# Patient Record
Sex: MALE | URBAN - METROPOLITAN AREA
[De-identification: names, ages, dates, MRNs, and addresses within clinical notes are randomized per-mention and may not be internally consistent; named-entity substitution may affect disease eponyms.]

---

## 2017-05-10 ENCOUNTER — EMERGENCY (EMERGENCY)
Facility: HOSPITAL | Age: 47
LOS: 1 days | Discharge: HOME | End: 2017-05-10
Attending: EMERGENCY MEDICINE | Admitting: EMERGENCY MEDICINE
Payer: SELF-PAY

## 2017-05-10 VITALS
HEART RATE: 70 BPM | DIASTOLIC BLOOD PRESSURE: 100 MMHG | OXYGEN SATURATION: 95 % | SYSTOLIC BLOOD PRESSURE: 136 MMHG | RESPIRATION RATE: 16 BRPM

## 2017-05-10 DIAGNOSIS — L84 CORNS AND CALLOSITIES: ICD-10-CM

## 2017-05-10 DIAGNOSIS — I10 ESSENTIAL (PRIMARY) HYPERTENSION: ICD-10-CM

## 2017-05-10 DIAGNOSIS — R46.0 VERY LOW LEVEL OF PERSONAL HYGIENE: ICD-10-CM

## 2017-05-10 DIAGNOSIS — M79.672 PAIN IN LEFT FOOT: ICD-10-CM

## 2017-05-10 DIAGNOSIS — F17.200 NICOTINE DEPENDENCE, UNSPECIFIED, UNCOMPLICATED: ICD-10-CM

## 2017-05-10 PROCEDURE — 99283 EMERGENCY DEPT VISIT LOW MDM: CPT | Mod: 25

## 2017-05-10 PROCEDURE — 99053 MED SERV 10PM-8AM 24 HR FAC: CPT

## 2017-05-10 RX ORDER — IBUPROFEN 200 MG
600 TABLET ORAL ONCE
Qty: 0 | Refills: 0 | Status: DISCONTINUED | OUTPATIENT
Start: 2017-05-10 | End: 2017-05-10

## 2017-05-10 RX ORDER — ACETAMINOPHEN 500 MG
650 TABLET ORAL ONCE
Qty: 0 | Refills: 0 | Status: COMPLETED | OUTPATIENT
Start: 2017-05-10 | End: 2017-05-10

## 2017-05-10 RX ORDER — IBUPROFEN 200 MG
600 TABLET ORAL ONCE
Qty: 0 | Refills: 0 | Status: COMPLETED | OUTPATIENT
Start: 2017-05-10 | End: 2017-05-10

## 2017-05-10 RX ADMIN — Medication 600 MILLIGRAM(S): at 00:54

## 2017-05-10 RX ADMIN — Medication 650 MILLIGRAM(S): at 00:33

## 2017-05-10 RX ADMIN — Medication 650 MILLIGRAM(S): at 00:54

## 2017-05-10 NOTE — ED PROVIDER NOTE - SKIN, MLM
Skin normal color for race, warm, dry and intact. No evidence of rash. +callus to b/l great toe and slight maceration of the skin to the interdigital spaces, no d/c, bleeding, crepitus, erythema, edema, or rash

## 2017-05-10 NOTE — ED PROVIDER NOTE - CONSTITUTIONAL, MLM
normal... Well appearing, well nourished M, awake, alert, oriented to person, place, time/situation and in no apparent distress.

## 2017-05-10 NOTE — ED PROVIDER NOTE - CARE PLAN
Principal Discharge DX:	Callus of foot  Secondary Diagnosis:	Poor hygiene  Secondary Diagnosis:	Chronic foot pain

## 2017-05-10 NOTE — ED PROVIDER NOTE - OBJECTIVE STATEMENT
47 yo M with PMHx of HTN, BIBA under PD custody presenting c/o b/l feet pain. 45 yo M with PMHx of HTN, BIBA under PD custody presenting c/o b/l feet pain. Pt reports not having good hygiene x 1 wk, has callus to b/l feet region and has been walking and wearing the same boots.  Now with worsening pain to b/l feet.  Also admits to smoking some K2 today.  Denies trauma, break in the skin, paresthesia, numbness, tingling, redness, bleeding, d/c, HA, dizziness, SOB, CP, calf pain, and focal weakness.  Pt is ambulatory

## 2017-05-10 NOTE — ED PROVIDER NOTE - MEDICAL DECISION MAKING DETAILS
given dose of pain meds, no s/s of infection on exam or acute intervention indicated, monitored in the ED, AxOx3, medically stable for d/c

## 2018-10-17 ENCOUNTER — EMERGENCY (EMERGENCY)
Facility: HOSPITAL | Age: 48
LOS: 1 days | Discharge: HOME | End: 2018-10-17
Attending: EMERGENCY MEDICINE | Admitting: EMERGENCY MEDICINE
Payer: SELF-PAY

## 2018-10-17 VITALS
DIASTOLIC BLOOD PRESSURE: 79 MMHG | SYSTOLIC BLOOD PRESSURE: 116 MMHG | OXYGEN SATURATION: 98 % | TEMPERATURE: 98 F | RESPIRATION RATE: 16 BRPM | HEART RATE: 74 BPM

## 2018-10-17 VITALS
WEIGHT: 154.98 LBS | SYSTOLIC BLOOD PRESSURE: 118 MMHG | OXYGEN SATURATION: 97 % | DIASTOLIC BLOOD PRESSURE: 86 MMHG | TEMPERATURE: 98 F | HEART RATE: 75 BPM | RESPIRATION RATE: 17 BRPM

## 2018-10-17 DIAGNOSIS — R25.1 TREMOR, UNSPECIFIED: ICD-10-CM

## 2018-10-17 DIAGNOSIS — F10.10 ALCOHOL ABUSE, UNCOMPLICATED: ICD-10-CM

## 2018-10-17 PROCEDURE — 99283 EMERGENCY DEPT VISIT LOW MDM: CPT

## 2018-10-17 RX ADMIN — Medication 50 MILLIGRAM(S): at 17:29

## 2018-10-17 NOTE — ED PROVIDER NOTE - NSFOLLOWUPCLINICS_GEN_ALL_ED_FT
[facility]  Family Medicine  [facility]. [address], [address]  [place], [place] [facility]  Phone: [phone]  Fax:   Follow Up Time:

## 2018-10-17 NOTE — ED PROVIDER NOTE - PHYSICAL EXAMINATION
VITAL SIGNS: I have reviewed nursing notes and confirm.  CONSTITUTIONAL: Well-developed; well-nourished; in no acute distress.  SKIN: Skin is warm and dry, no acute rash.  HEAD: Normocephalic; atraumatic.  EYES: PERRL, EOM intact; conjunctiva and sclera clear.  ENT: No nasal discharge; airway clear.  Minimal tongue faciculations.   NECK: Supple; non tender.  CARD: S1, S2 normal; no murmurs, gallops, or rubs. Regular rate and rhythm.  RESP: No wheezes, rales or rhonchi.  ABD: Normal bowel sounds; soft; non-distended; non-tender; no hepatosplenomegaly.  EXT: Normal ROM. No clubbing, cyanosis or edema.  NEURO: Alert, oriented. Grossly unremarkable.  Minimal tremors.   PSYCH: Cooperative, appropriate.

## 2018-10-17 NOTE — ED PROVIDER NOTE - OBJECTIVE STATEMENT
Pt is a 48yo M with a h/o AA.  Reports szrs in past 2/2 "alcohol use" and denies epilepsy.  Not on any medications.  Reports szr this morning.  Unwitnessed.  Pt unable to give any details about szr.  Reports last alcoholic drink was last night.  Reports mild tremors.  Denies any HA, N/V, abd pain, AH/VH/TH, anxiety, or disorientation.  No other complaints.

## 2018-10-17 NOTE — ED PROVIDER NOTE - NSFOLLOWUPINSTRUCTIONS_ED_ALL_ED_FT
PLEASE FOLLOW-UP WITH YOUR PRIMARY CARE DOCTOR IN 1-2 DAY FOR FURTHER EVALUATION.  PLEASE TAKE ALL PAPERWORK FROM TODAY'S VISIT TO YOUR PRIMARY DOCTOR.  IF YOU DO NOT HAVE A PRIMARY CARE DOCTOR PLEASE REFER TO THE OFFICE/CLINIC INFORMATION GIVEN ABOVE.  YOU MAY ALSO CALL [phone] AND ASK FOR MS. JAM NASH.  SHE CAN HELP YOU MAKE A FOLLOW-UP APPOINTMENT.  HER HOURS ARE 11AM-7PM MONDAY - FRIDAY.    PLEASE RETURN TO THE ER IMMEDIATELY OR CALL 911 FOR ANY HIGH FEVER, TROUBLE BREATHING, VOMITING, SEVERE PAIN, OR ANY OTHER CONCERNS.

## 2018-10-17 NOTE — ED ADULT TRIAGE NOTE - CHIEF COMPLAINT QUOTE
here for unwitnessed seizure- states last seizure was 1 year ago- admits to drinking alcohol everyday- last drink was last night- also admits to marijuana- Pt is neg for biting of the tongue, urinary incontinence. Pt is a+ox3 on arrival and is non tremulous

## 2018-10-17 NOTE — ED ADULT NURSE NOTE - NSIMPLEMENTINTERV_GEN_ALL_ED
Implemented All Universal Safety Interventions:  [place] to call system. Call bell, personal items and telephone within reach. Instruct patient to call for assistance. Room bathroom lighting operational. Non-slip footwear when patient is off stretcher. Physically safe environment: no spills, clutter or unnecessary equipment. Stretcher in lowest position, wheels locked, appropriate side rails in place.

## 2019-12-05 ENCOUNTER — HOSPITAL ENCOUNTER (INPATIENT)
Dept: HOSPITAL 74 - YASAS | Age: 49
LOS: 8 days | Discharge: HOME | DRG: 772 | End: 2019-12-13
Attending: NEUROMUSCULOSKELETAL MEDICINE & OMM | Admitting: NEUROMUSCULOSKELETAL MEDICINE & OMM
Payer: COMMERCIAL

## 2019-12-05 VITALS — BODY MASS INDEX: 23.8 KG/M2

## 2019-12-05 DIAGNOSIS — Z59.0: ICD-10-CM

## 2019-12-05 DIAGNOSIS — F12.20: ICD-10-CM

## 2019-12-05 DIAGNOSIS — F17.210: ICD-10-CM

## 2019-12-05 DIAGNOSIS — Z87.438: ICD-10-CM

## 2019-12-05 DIAGNOSIS — F14.20: ICD-10-CM

## 2019-12-05 DIAGNOSIS — I10: ICD-10-CM

## 2019-12-05 DIAGNOSIS — F10.20: Primary | ICD-10-CM

## 2019-12-05 PROCEDURE — G0008 ADMIN INFLUENZA VIRUS VAC: HCPCS

## 2019-12-05 PROCEDURE — HZ42ZZZ GROUP COUNSELING FOR SUBSTANCE ABUSE TREATMENT, COGNITIVE-BEHAVIORAL: ICD-10-PCS | Performed by: ALLERGY & IMMUNOLOGY

## 2019-12-05 RX ADMIN — Medication SCH MG: at 22:40

## 2019-12-05 SDOH — ECONOMIC STABILITY - HOUSING INSECURITY: HOMELESSNESS: Z59.0

## 2019-12-05 NOTE — PN
Teaching Attending Note


Name of Resident: Supriya Mahan





ATTENDING PHYSICIAN STATEMENT





I saw and evaluated the patient.


I reviewed the resident's note and discussed the case with the resident.


I agree with the resident's findings and plan as documented.








SUBJECTIVE: pt here requesting detox from etoh use , reports 1-3  drinks daily  

20 oz cans and some liquor on weekends,  denies symptoms if  not drinking  . 








OBJECTIVE:  wnwd  , NAD  





ASSESSMENT AND PLAN: Alcohol  use  d/o  - rehab 


 Vital Signs - 24 hr











  12/05/19 12/05/19





  16:29 18:40


 


Temperature 98.1 F 98.1 F


 


Pulse Rate 99 H 99 H


 


Respiratory 17 17





Rate  


 


Blood Pressure 155/95 155/95

## 2019-12-05 NOTE — HP
CIWA Score


Nausea/Vomitin-No Nausea/No Vomiting


Muscle Tremors: None


Anxiety: 0-No Anxiety, at Ease


Agitation: 0-Normal Activity


Paroxysmal Sweats: No Perspiration


Orientation: 0-Oriented


Tacttile Disturbances: 0-None


Auditory Disturbances: 0-None


Visual Disturbances: 0-None


Headache: 0-None Present


CIWA-Ar Total Score: 0





- Admission Criteria


[facility] Guidelines: Admission for Medically Managed Detox: 


Requires at least one of the followin. CIWA greater than 12


2. Seizures within the past 24 hours


3. Delirium tremens within the past 24 hours


4. Hallucinations within the past 24 hours


5. Acute intervention needed for co  occurring medical disorder


6. Acute intervention needed for co  occurring psychiatric disorder


7. Severe withdrawal that cannot be handled at a lower level of care (continued


    vomiting, continued diarrhea, abnormal vital signs) requiring intravenous


    medication and/or fluids


8. Pregnancy








Admission ROS [facility]





- [facility]


Allergies/Adverse Reactions: 


 Allergies











Allergy/AdvReac Type Severity Reaction Status Date / Time


 


No Known Allergies Allergy   Verified 19 16:29











History of Present Illness: 





49 y/o M with history of alcohol, crack cocaine and marijuana use presenting to 

[facility] initially for detox as he is looking to go into a "long term program 

which can eventually help with housing." Pt has been drinking since he was in 

his teens. He currently drinks about 2-3 12oz can of marguerita alcohol a day 

and half a pint of vodka on weekends. he denies currently experiencing any 

withdrawal symptoms.He denies any blackouts or withdrawal seizure. LAst drink 

this aftern


He also smokes crack cocaine on and off since he was 18. he currently smokes 40-

50$ twice a month. no injection


He also smokes 2-3 blunts twice a week. 1PPD of cigarettes since 16 years of 

age.





PMH: HTN


PSYCH HX: none


PSH:  Maxillofacial surgery from dog bite





social Hx: currently lives with family, unemployed





PE: 


VS : 98.1F, 155/95 mmHg, 99bpm, 17


Utox: THC 


CORAL: 0.044


CIWA:0





General : NAD


HEENT: PERRLA, moist mucous membranes


LUNGS: VBS b/l


HEART: RRR no MRG


Abdomen: +BS, NTND


extremities: 2+ pulses, no edema


neuro: grossly intact





Plan: substance use disorder: admit to rehab when CORAL is 0


       HTN: norvasc 5mg PO daily 


      Smoking cessation counseled: declined patch 





 





- Ebola screening


Have you traveled outside of the country in the last 21 days: No


Have you had contact with anyone from an Ebola affected area: No


Do you have a fever: No





Patient History





- Smoking Cessation


Smoking history: Current every day smoker


Aproximately how many cigarettes per day: 20


Hx Chewing Tobacco Use: No


Initiated information on smoking cessation: Yes


'Breaking Loose' booklet given: 19





- Substances abused


  ** Alcohol


Substance route: Oral


Frequency: Daily


Amount used: 1/2 PINT OF VODKA, 3-12 OZ MARGARITAS


Age of first use: 11


Date of last use: 19





  ** Crack


Substance route: Smoking


Frequency: 1-3 times last 30 days


Amount used: $50


Age of first use: 17


Date of last use: 19





  ** Marijuana/Hashish


Substance route: Smoking


Frequency: 1-2 times per week


Amount used: $20


Age of first use: 11


Date of last use: 19





Admission Physical Exam [facility]





- Vital Signs


Vital Signs: 


 Vital Signs - 24 hr











  19





  16:29 18:40


 


Temperature 98.1 F 98.1 F


 


Pulse Rate 99 H 99 H


 


Respiratory 17 17





Rate  


 


Blood Pressure 155/95 155/95














Breathalyzer





- Breathalyzer


Breathalyzer: 0.044





Urine Drug Screen





- Test Device


Lot number: [id number]


Expiration date: 21





- Control


Is test valid?: Yes





- Results


Drug screen NEGATIVE: No


Urine drug screen results: THC-Marijuana





Inpatient Rehab Admission





- Rehab Decision to Admit


Inpatient rehab admission?: Yes





- Initial Determination


Are CD services needed?: Yes


Free of communicable disease: Yes


Not in need of hospitalization: Yes





- Rehab Admission Criteria


Previous failed treatment: No


Poor recovery environment: Yes


Comorbidities: Yes


Lacks judgement: Yes


Patient is meeting Inpatient Rehab admission criteria:: Yes

## 2019-12-06 LAB
ALBUMIN SERPL-MCNC: 3.8 G/DL (ref 3.4–5)
ALP SERPL-CCNC: 104 U/L (ref 45–117)
ALT SERPL-CCNC: 20 U/L (ref 13–61)
ANION GAP SERPL CALC-SCNC: 5 MMOL/L (ref 8–16)
AST SERPL-CCNC: 13 U/L (ref 15–37)
BILIRUB SERPL-MCNC: 0.5 MG/DL (ref 0.2–1)
BUN SERPL-MCNC: 10.5 MG/DL (ref 7–18)
CALCIUM SERPL-MCNC: 9.6 MG/DL (ref 8.5–10.1)
CHLORIDE SERPL-SCNC: 104 MMOL/L (ref 98–107)
CO2 SERPL-SCNC: 32 MMOL/L (ref 21–32)
CREAT SERPL-MCNC: 0.9 MG/DL (ref 0.55–1.3)
DEPRECATED RDW RBC AUTO: 15.2 % (ref 11.9–15.9)
GLUCOSE SERPL-MCNC: 89 MG/DL (ref 74–106)
HCT VFR BLD CALC: 39.6 % (ref 35.4–49)
HGB BLD-MCNC: 12.6 GM/DL (ref 11.7–16.9)
MCH RBC QN AUTO: 27.8 PG (ref 25.7–33.7)
MCHC RBC AUTO-ENTMCNC: 31.9 G/DL (ref 32–35.9)
MCV RBC: 87.2 FL (ref 80–96)
PLATELET # BLD AUTO: 272 K/MM3 (ref 134–434)
PMV BLD: 8.2 FL (ref 7.5–11.1)
POTASSIUM SERPLBLD-SCNC: 4.4 MMOL/L (ref 3.5–5.1)
PROT SERPL-MCNC: 7.1 G/DL (ref 6.4–8.2)
RBC # BLD AUTO: 4.54 M/MM3 (ref 4–5.6)
SODIUM SERPL-SCNC: 141 MMOL/L (ref 136–145)
WBC # BLD AUTO: 6 K/MM3 (ref 4–10)

## 2019-12-06 RX ADMIN — Medication SCH TAB: at 09:06

## 2019-12-06 RX ADMIN — Medication SCH MG: at 21:21

## 2019-12-06 NOTE — PN
[facility] Progress Note


Note: 





Pt is a 47 y/o male with a hx of [place] admitted to rehab from [facility]. Pt reorts he 

has no PCP and goes to [facility] for care when needed. Pt has a hx of 

unmanaged HTN and was currently put on Amlodipine 5 mg po daily on admission. 

Pt reports he would like to go to a long term treatment after here.





 Vital Signs - 24 hr











  12/05/19 12/05/19 12/05/19





  16:29 18:40 21:39


 


Temperature 98.1 F 98.1 F 97.9 F


 


Pulse Rate 99 H 99 H 74


 


Respiratory 17 17 18





Rate   


 


Blood Pressure 155/95 155/95 














  12/05/19 12/06/19 12/06/19





  22:36 00:30 03:30


 


Temperature 97.8 F  


 


Pulse Rate 74  


 


Respiratory 19 18 18





Rate   


 


Blood Pressure 148/89  














  12/06/19





  09:05


 


Temperature 


 


Pulse Rate 91 H


 


Respiratory 





Rate 


 


Blood Pressure 162/106 H








 Laboratory Tests











  12/06/19 12/06/19





  10:00 10:00


 


WBC  6.0 


 


RBC  4.54 


 


Hgb  12.6 


 


Hct  39.6 


 


MCV  87.2 


 


MCH  27.8 


 


MCHC  31.9 L 


 


RDW  15.2 


 


Plt Count  272 


 


MPV  8.2 


 


Sodium   141


 


Potassium   4.4


 


Chloride   104


 


Carbon Dioxide   32


 


Anion Gap   5 L


 


BUN   10.5


 


Creatinine   0.9


 


Est GFR (CKD-EPI)AfAm   115.02


 


Est GFR (CKD-EPI)NonAf   99.24


 


Random Glucose   89


 


Calcium   9.6


 


Total Bilirubin   0.5


 


AST   13 L


 


ALT   20


 


Alkaline Phosphatase   104


 


Total Protein   7.1


 


Albumin   3.8








Alert o x 3


nad


oob ambulating with steady gait








A/P


 New rehab pt


Uncontrolled HTN








Maintain safety


D/w pt will increase Amlodipine 10 mg po daily 


Clonidine 0.1 mg po BID prn for elevated BP


Pt will follow up with counselor to arrange for aftercare.


Pt will need to be connectd to a primary care after long term treatment.

## 2019-12-07 LAB
APPEARANCE UR: CLEAR
BACTERIA # UR AUTO: 23.9 /HPF
BILIRUB UR STRIP.AUTO-MCNC: NEGATIVE MG/DL
CASTS URNS QL MICRO: 16 /LPF (ref 0–8)
COLOR UR: YELLOW
EPITH CASTS URNS QL MICRO: 0.2 /HPF
KETONES UR QL STRIP: NEGATIVE
LEUKOCYTE ESTERASE UR QL STRIP.AUTO: (no result)
NITRITE UR QL STRIP: NEGATIVE
PH UR: 8.5 [PH] (ref 5–8)
PROT UR QL STRIP: NEGATIVE
PROT UR QL STRIP: NEGATIVE
RPR: (no result)
SP GR UR: 1.02 (ref 1.01–1.03)
TREPONEMA ANTIBODY: REACTIVE
UROBILINOGEN UR STRIP-MCNC: 0.2 MG/DL (ref 0.2–1)
WBC # UR AUTO: 28 /HPF (ref 0–5)

## 2019-12-07 RX ADMIN — Medication SCH MG: at 21:10

## 2019-12-07 RX ADMIN — AMLODIPINE BESYLATE SCH MG: 10 TABLET ORAL at 10:20

## 2019-12-07 RX ADMIN — Medication SCH TAB: at 10:20

## 2019-12-07 NOTE — PN
[facility] Progress Note


Note: 





 Laboratory Last Values











WBC  6.0 K/mm3 (4.0-10.0)   12/06/19  10:00    


 


RBC  4.54 M/mm3 (4.00-5.60)   12/06/19  10:00    


 


Hgb  12.6 GM/dL (11.7-16.9)   12/06/19  10:00    


 


Hct  39.6 % (35.4-49)   12/06/19  10:00    


 


MCV  87.2 fl (80-96)   12/06/19  10:00    


 


MCH  27.8 pg (25.7-33.7)   12/06/19  10:00    


 


MCHC  31.9 g/dl (32.0-35.9)  L  12/06/19  10:00    


 


RDW  15.2 % (11.9-15.9)   12/06/19  10:00    


 


Plt Count  272 K/MM3 (134-434)   12/06/19  10:00    


 


MPV  8.2 fl (7.5-11.1)   12/06/19  10:00    


 


Sodium  141 mmol/L (136-145)   12/06/19  10:00    


 


Potassium  4.4 mmol/L (3.5-5.1)   12/06/19  10:00    


 


Chloride  104 mmol/L ()   12/06/19  10:00    


 


Carbon Dioxide  32 mmol/L (21-32)   12/06/19  10:00    


 


Anion Gap  5 MMOL/L (8-16)  L  12/06/19  10:00    


 


BUN  10.5 mg/dL (7-18)   12/06/19  10:00    


 


Creatinine  0.9 mg/dL (0.55-1.3)   12/06/19  10:00    


 


Est GFR (CKD-EPI)AfAm  115.02   12/06/19  10:00    


 


Est GFR (CKD-EPI)NonAf  99.24   12/06/19  10:00    


 


Random Glucose  89 mg/dL ()   12/06/19  10:00    


 


Calcium  9.6 mg/dL (8.5-10.1)   12/06/19  10:00    


 


Total Bilirubin  0.5 mg/dL (0.2-1)   12/06/19  10:00    


 


AST  13 U/L (15-37)  L  12/06/19  10:00    


 


ALT  20 U/L (13-61)   12/06/19  10:00    


 


Alkaline Phosphatase  104 U/L ()   12/06/19  10:00    


 


Total Protein  7.1 g/dl (6.4-8.2)   12/06/19  10:00    


 


Albumin  3.8 g/dl (3.4-5.0)   12/06/19  10:00    


 


Urine Color  Yellow   12/07/19  08:40    


 


Urine Appearance  Clear   12/07/19  08:40    


 


Urine pH  8.5  (5.0-8.0)  H  12/07/19  08:40    


 


Ur Specific Gravity  1.023  (1.010-1.035)   12/07/19  08:40    


 


Urine Protein  Negative  (NEGATIVE)   12/07/19  08:40    


 


Urine Glucose (UA)  Negative  (NEGATIVE)   12/07/19  08:40    


 


Urine Ketones  Negative  (NEGATIVE)   12/07/19  08:40    


 


Urine Blood  1+  (NEGATIVE)  H  12/07/19  08:40    


 


Urine Nitrite  Negative  (NEGATIVE)   12/07/19  08:40    


 


Urine Bilirubin  Negative  (NEGATIVE)   12/07/19  08:40    


 


Urine Urobilinogen  0.2 mg/dL (0.2-1.0)   12/07/19  08:40    


 


Ur Leukocyte Esterase  1+  (NEGATIVE)  H  12/07/19  08:40    


 


Urine WBC (Auto)  28 /hpf (0-5)   12/07/19  08:40    


 


Urine Casts (Auto)  16 /lpf (0-8)   12/07/19  08:40    


 


U Epithel Cells (Auto)  0.2 /HPF (0-5/HPF)   12/07/19  08:40    


 


Urine Bacteria (Auto)  23.9 /hpf (NEGATIVE)   12/07/19  08:40    


 


RPR Titer  Reactive 1:8  (NONREACTIVE)  H  12/06/19  10:00    








rpr 1:8


treponemal antibody pending


waiting for treponemal antibody result

## 2019-12-08 RX ADMIN — Medication SCH TAB: at 10:13

## 2019-12-08 RX ADMIN — Medication SCH MG: at 21:10

## 2019-12-08 RX ADMIN — AMLODIPINE BESYLATE SCH MG: 10 TABLET ORAL at 10:14

## 2019-12-09 RX ADMIN — Medication SCH MG: at 21:16

## 2019-12-09 RX ADMIN — AMLODIPINE BESYLATE SCH MG: 10 TABLET ORAL at 10:43

## 2019-12-09 RX ADMIN — Medication SCH TAB: at 10:43
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
## 2019-12-10 RX ADMIN — AMLODIPINE BESYLATE SCH MG: 10 TABLET ORAL at 10:36

## 2019-12-10 RX ADMIN — Medication SCH: at 21:57

## 2019-12-10 RX ADMIN — TOLNAFTATE SCH: 10 CREAM TOPICAL at 21:57

## 2019-12-10 RX ADMIN — Medication SCH TAB: at 10:36

## 2019-12-11 VITALS — TEMPERATURE: 97.8 F

## 2019-12-11 RX ADMIN — TOLNAFTATE SCH APPLIC: 10 CREAM TOPICAL at 10:25

## 2019-12-11 RX ADMIN — Medication SCH MG: at 21:15

## 2019-12-11 RX ADMIN — AMLODIPINE BESYLATE SCH MG: 10 TABLET ORAL at 10:29

## 2019-12-11 RX ADMIN — TOLNAFTATE SCH APPLIC: 10 CREAM TOPICAL at 21:15

## 2019-12-11 RX ADMIN — Medication SCH TAB: at 10:29

## 2019-12-12 RX ADMIN — Medication SCH MG: at 21:24

## 2019-12-12 RX ADMIN — Medication SCH TAB: at 10:11

## 2019-12-12 RX ADMIN — TOLNAFTATE SCH: 10 CREAM TOPICAL at 10:12

## 2019-12-12 RX ADMIN — AMLODIPINE BESYLATE SCH MG: 10 TABLET ORAL at 10:11

## 2019-12-12 RX ADMIN — TOLNAFTATE SCH: 10 CREAM TOPICAL at 21:24

## 2019-12-13 VITALS — HEART RATE: 67 BPM | DIASTOLIC BLOOD PRESSURE: 76 MMHG | SYSTOLIC BLOOD PRESSURE: 130 MMHG

## 2019-12-13 RX ADMIN — Medication SCH TAB: at 09:02

## 2019-12-13 RX ADMIN — TOLNAFTATE SCH APPLIC: 10 CREAM TOPICAL at 09:02

## 2019-12-13 RX ADMIN — AMLODIPINE BESYLATE SCH MG: 10 TABLET ORAL at 09:02

## 2019-12-13 NOTE — DS
[facility] Rehab Discharge Summary





- [facility] Rehab Discharge Summary


Admission Date: 12/05/19


Discharge Date: 12/13/19





- History


Present History: Alcohol dependence, Cocaine dependence





- Discharge Physical Exam


Vital Signs: 


 Vital Signs











Temperature  97.8 F   12/13/19 06:49


 


Pulse Rate  67   12/13/19 06:49


 


Respiratory Rate  18   12/13/19 06:49


 


Blood Pressure  130/76   12/13/19 06:49


 


O2 Sat by Pulse Oximetry (%)      











Pertinent Admission Physical Exam Findings: 





49 y/o M with history of alcohol, crack cocaine and marijuana. Pt has been 

drinking since he was in his teens. He currently drinks about 2-3 12oz can of 

marguerita alcohol a day and half a pint of vodka on weekends. He denies 

currently experiencing any withdrawal symptom: any blackouts, withdrawal 

seizure and shakes. He also smokes crack cocaine on and off since he was 18. he 

currently smokes 40-50$ twice a month. no injection


He also smokes 2-3 blunts twice a week. 1PPD of cigarettes since 16 years of 

age.





PMH: HTN





ROS: denies alcohol cravings, n/v/d, shakes and sweating








PE





alert and oriented x 3


skin warm and dry


+perrla, eoms intact bl


gi nt, nd


ext full rom, amb ad juancarlos


no tremors


in NAD


denies SI/HI





- Treatment


Discharge Condition: Discharge condition good


Hospital Course: 





Patient completed rehab, attended group meetings and 1:1 counseling sessions. 

He was treated for Syphilis during admission. Patient in stable medical 

condition and denies SI/HI. Aftercare arranged for [facility] which he will 

go to today after discharge. Patient encouraged to continue group meetings to 

prevent relapse and follow up with PCP for medical management. 


 Laboratory Tests











  12/06/19 12/06/19 12/06/19





  10:00 10:00 10:00


 


WBC  6.0  


 


RBC  4.54  


 


Hgb  12.6  


 


Hct  39.6  


 


MCV  87.2  


 


MCH  27.8  


 


MCHC  31.9 L  


 


RDW  15.2  


 


Plt Count  272  


 


MPV  8.2  


 


Sodium   141 


 


Potassium   4.4 


 


Chloride   104 


 


Carbon Dioxide   32 


 


Anion Gap   5 L 


 


BUN   10.5 


 


Creatinine   0.9 


 


Est GFR (CKD-EPI)AfAm   115.02 


 


Est GFR (CKD-EPI)NonAf   99.24 


 


Random Glucose   89 


 


Calcium   9.6 


 


Total Bilirubin   0.5 


 


AST   13 L 


 


ALT   20 


 


Alkaline Phosphatase   104 


 


Total Protein   7.1 


 


Albumin   3.8 


 


Urine Color   


 


Urine Appearance   


 


Urine pH   


 


Ur Specific Gravity   


 


Urine Protein   


 


Urine Glucose (UA)   


 


Urine Ketones   


 


Urine Blood   


 


Urine Nitrite   


 


Urine Bilirubin   


 


Urine Urobilinogen   


 


Ur Leukocyte Esterase   


 


Urine WBC (Auto)   


 


Urine Casts (Auto)   


 


U Epithel Cells (Auto)   


 


Urine Bacteria (Auto)   


 


RPR Titer    Reactive 1:8 H


 


T.pallidum Ab (MHA)    Reactive














  12/07/19





  08:40


 


WBC 


 


RBC 


 


Hgb 


 


Hct 


 


MCV 


 


MCH 


 


MCHC 


 


RDW 


 


Plt Count 


 


MPV 


 


Sodium 


 


Potassium 


 


Chloride 


 


Carbon Dioxide 


 


Anion Gap 


 


BUN 


 


Creatinine 


 


Est GFR (CKD-EPI)AfAm 


 


Est GFR (CKD-EPI)NonAf 


 


Random Glucose 


 


Calcium 


 


Total Bilirubin 


 


AST 


 


ALT 


 


Alkaline Phosphatase 


 


Total Protein 


 


Albumin 


 


Urine Color  Yellow


 


Urine Appearance  Clear


 


Urine pH  8.5 H


 


Ur Specific Gravity  1.023


 


Urine Protein  Negative


 


Urine Glucose (UA)  Negative


 


Urine Ketones  Negative


 


Urine Blood  1+ H


 


Urine Nitrite  Negative


 


Urine Bilirubin  Negative


 


Urine Urobilinogen  0.2


 


Ur Leukocyte Esterase  1+ H


 


Urine WBC (Auto)  28


 


Urine Casts (Auto)  16


 


U Epithel Cells (Auto)  0.2


 


Urine Bacteria (Auto)  23.9


 


RPR Titer 


 


T.pallidum Ab (MHA) 








 Vital Signs











Temperature  97.8 F   12/13/19 06:49


 


Pulse Rate  67   12/13/19 06:49


 


Respiratory Rate  18   12/13/19 06:49


 


Blood Pressure  130/76   12/13/19 06:49


 


O2 Sat by Pulse Oximetry (%)      








Ambulatory Orders





Amlodipine Besylate 10 mg PO DAILY #30 tablet 12/13/19 











- Medication


Discharge Medications: 


Ambulatory Orders





Amlodipine Besylate 10 mg PO DAILY #30 tablet 12/13/19 











- Medication-Assisted Treatment (MAT)


Medication-Assisted Treatment (MAT): No





- Discharge Instructions


Diet, activity, other medical instructions: 





Diet: SIL as fortunato





Activity: as fortunato





Other medical instructions:


follow up with pcp as recommended





- Follow-up Referral


Minutes to complete discharge: 30





- AMA


Did Patient Leave Against Medical Advice: No